# Patient Record
Sex: FEMALE | Race: BLACK OR AFRICAN AMERICAN | NOT HISPANIC OR LATINO | Employment: FULL TIME | URBAN - METROPOLITAN AREA
[De-identification: names, ages, dates, MRNs, and addresses within clinical notes are randomized per-mention and may not be internally consistent; named-entity substitution may affect disease eponyms.]

---

## 2019-04-12 ENCOUNTER — HOSPITAL ENCOUNTER (EMERGENCY)
Facility: HOSPITAL | Age: 34
Discharge: HOME | End: 2019-04-12
Attending: EMERGENCY MEDICINE

## 2019-04-12 VITALS
OXYGEN SATURATION: 100 % | RESPIRATION RATE: 20 BRPM | HEIGHT: 65 IN | TEMPERATURE: 99 F | HEART RATE: 67 BPM | WEIGHT: 240.94 LBS | SYSTOLIC BLOOD PRESSURE: 150 MMHG | BODY MASS INDEX: 40.14 KG/M2 | DIASTOLIC BLOOD PRESSURE: 88 MMHG

## 2019-04-12 DIAGNOSIS — R19.7 DIARRHEA, UNSPECIFIED TYPE: ICD-10-CM

## 2019-04-12 DIAGNOSIS — R11.2 NAUSEA & VOMITING: Primary | ICD-10-CM

## 2019-04-12 LAB
ALBUMIN SERPL BCP-MCNC: 4.5 G/DL (ref 3.5–5.2)
ALP SERPL-CCNC: 66 U/L (ref 55–135)
ALT SERPL W/O P-5'-P-CCNC: 18 U/L (ref 10–44)
ANION GAP SERPL CALC-SCNC: 14 MMOL/L (ref 8–16)
AST SERPL-CCNC: 16 U/L (ref 10–40)
B-HCG UR QL: NEGATIVE
BACTERIA #/AREA URNS HPF: ABNORMAL /HPF
BASOPHILS # BLD AUTO: 0.01 K/UL (ref 0–0.2)
BASOPHILS NFR BLD: 0.2 % (ref 0–1.9)
BILIRUB SERPL-MCNC: 0.6 MG/DL (ref 0.1–1)
BILIRUB UR QL STRIP: ABNORMAL
BUN SERPL-MCNC: 13 MG/DL (ref 6–20)
CALCIUM SERPL-MCNC: 9.9 MG/DL (ref 8.7–10.5)
CHLORIDE SERPL-SCNC: 101 MMOL/L (ref 95–110)
CLARITY UR: ABNORMAL
CO2 SERPL-SCNC: 25 MMOL/L (ref 23–29)
COLOR UR: YELLOW
CREAT SERPL-MCNC: 0.7 MG/DL (ref 0.5–1.4)
DIFFERENTIAL METHOD: ABNORMAL
EOSINOPHIL # BLD AUTO: 0 K/UL (ref 0–0.5)
EOSINOPHIL NFR BLD: 0.2 % (ref 0–8)
ERYTHROCYTE [DISTWIDTH] IN BLOOD BY AUTOMATED COUNT: 16.4 % (ref 11.5–14.5)
EST. GFR  (AFRICAN AMERICAN): >60 ML/MIN/1.73 M^2
EST. GFR  (NON AFRICAN AMERICAN): >60 ML/MIN/1.73 M^2
GLUCOSE SERPL-MCNC: 110 MG/DL (ref 70–110)
GLUCOSE UR QL STRIP: NEGATIVE
HCT VFR BLD AUTO: 40.7 % (ref 37–48.5)
HGB BLD-MCNC: 13.1 G/DL (ref 12–16)
HGB UR QL STRIP: ABNORMAL
HYALINE CASTS #/AREA URNS LPF: 0 /LPF
KETONES UR QL STRIP: ABNORMAL
LEUKOCYTE ESTERASE UR QL STRIP: NEGATIVE
LIPASE SERPL-CCNC: 25 U/L (ref 4–60)
LYMPHOCYTES # BLD AUTO: 1.6 K/UL (ref 1–4.8)
LYMPHOCYTES NFR BLD: 26 % (ref 18–48)
MCH RBC QN AUTO: 24.4 PG (ref 27–31)
MCHC RBC AUTO-ENTMCNC: 32.2 G/DL (ref 32–36)
MCV RBC AUTO: 76 FL (ref 82–98)
MICROSCOPIC COMMENT: ABNORMAL
MONOCYTES # BLD AUTO: 0.5 K/UL (ref 0.3–1)
MONOCYTES NFR BLD: 8.4 % (ref 4–15)
NEUTROPHILS # BLD AUTO: 3.9 K/UL (ref 1.8–7.7)
NEUTROPHILS NFR BLD: 65.2 % (ref 38–73)
NITRITE UR QL STRIP: NEGATIVE
PH UR STRIP: >8 [PH] (ref 5–8)
PLATELET # BLD AUTO: 287 K/UL (ref 150–350)
PMV BLD AUTO: 10.6 FL (ref 9.2–12.9)
POTASSIUM SERPL-SCNC: 3.2 MMOL/L (ref 3.5–5.1)
PROT SERPL-MCNC: 7.9 G/DL (ref 6–8.4)
PROT UR QL STRIP: ABNORMAL
RBC # BLD AUTO: 5.36 M/UL (ref 4–5.4)
RBC #/AREA URNS HPF: >100 /HPF (ref 0–4)
SODIUM SERPL-SCNC: 140 MMOL/L (ref 136–145)
SP GR UR STRIP: 1.01 (ref 1–1.03)
URN SPEC COLLECT METH UR: ABNORMAL
UROBILINOGEN UR STRIP-ACNC: 1 EU/DL
WBC # BLD AUTO: 6.04 K/UL (ref 3.9–12.7)
WBC #/AREA URNS HPF: 3 /HPF (ref 0–5)

## 2019-04-12 PROCEDURE — 81025 URINE PREGNANCY TEST: CPT

## 2019-04-12 PROCEDURE — 81000 URINALYSIS NONAUTO W/SCOPE: CPT

## 2019-04-12 PROCEDURE — 96374 THER/PROPH/DIAG INJ IV PUSH: CPT

## 2019-04-12 PROCEDURE — 80053 COMPREHEN METABOLIC PANEL: CPT

## 2019-04-12 PROCEDURE — 96375 TX/PRO/DX INJ NEW DRUG ADDON: CPT

## 2019-04-12 PROCEDURE — 83690 ASSAY OF LIPASE: CPT

## 2019-04-12 PROCEDURE — 96361 HYDRATE IV INFUSION ADD-ON: CPT

## 2019-04-12 PROCEDURE — 63600175 PHARM REV CODE 636 W HCPCS: Performed by: EMERGENCY MEDICINE

## 2019-04-12 PROCEDURE — 85025 COMPLETE CBC W/AUTO DIFF WBC: CPT

## 2019-04-12 PROCEDURE — 25000003 PHARM REV CODE 250: Performed by: EMERGENCY MEDICINE

## 2019-04-12 PROCEDURE — 99285 EMERGENCY DEPT VISIT HI MDM: CPT | Mod: 25

## 2019-04-12 RX ORDER — ALBUTEROL SULFATE 0.63 MG/3ML
0.63 SOLUTION RESPIRATORY (INHALATION) EVERY 6 HOURS PRN
COMMUNITY
End: 2023-11-03

## 2019-04-12 RX ORDER — ONDANSETRON 4 MG/1
4 TABLET, ORALLY DISINTEGRATING ORAL EVERY 6 HOURS PRN
Qty: 15 TABLET | Refills: 0 | Status: SHIPPED | OUTPATIENT
Start: 2019-04-12 | End: 2023-11-03

## 2019-04-12 RX ORDER — ONDANSETRON 2 MG/ML
4 INJECTION INTRAMUSCULAR; INTRAVENOUS
Status: COMPLETED | OUTPATIENT
Start: 2019-04-12 | End: 2019-04-12

## 2019-04-12 RX ORDER — PROMETHAZINE HYDROCHLORIDE 25 MG/1
25 SUPPOSITORY RECTAL EVERY 6 HOURS PRN
Qty: 10 SUPPOSITORY | Refills: 0 | Status: SHIPPED | OUTPATIENT
Start: 2019-04-12 | End: 2023-11-03

## 2019-04-12 RX ADMIN — PROMETHAZINE HYDROCHLORIDE 12.5 MG: 25 INJECTION INTRAMUSCULAR; INTRAVENOUS at 09:04

## 2019-04-12 RX ADMIN — ONDANSETRON HYDROCHLORIDE 4 MG: 2 SOLUTION INTRAMUSCULAR; INTRAVENOUS at 08:04

## 2019-04-12 RX ADMIN — SODIUM CHLORIDE 1000 ML: 0.9 INJECTION, SOLUTION INTRAVENOUS at 08:04

## 2019-04-12 NOTE — ED PROVIDER NOTES
SCRIBE #1 NOTE: I, Nita Heart, am scribing for, and in the presence of, Jose Richardson MD. I have scribed the entire note.      History      Chief Complaint   Patient presents with    Emesis     c/o N/V that started yesterday        Review of patient's allergies indicates:  No Known Allergies     HPI   HPI    4/12/2019, 7:28 AM   History obtained from the patient      History of Present Illness: Ирина Wolf is a 34 y.o. female patient who presents to the Emergency Department for n/v which onset gradually 48 hours PTA. Symptoms are episodic and moderate in severity. No mitigating or exacerbating factors reported. Associated sxs include sharp, epigastric abd pain. Pt reports she had diarrhea yesterday but has had no episodes today. Pt reports she began having blood in her vomit today. Patient denies any fever, chills, constipation, hematochezia, melena, dysuria, hematuria, frequency, sick contacts, and all other sxs at this time. First day of LMP was yesterday. No further complaints or concerns at this time.       Arrival mode: Personal vehicle      PCP: Provider [facility]       Past Medical History:  Past Medical History:   Diagnosis Date    Asthma     Seizures        Past Surgical History:  Past Surgical History:   Procedure Laterality Date    none           Family History:  History reviewed. No pertinent family history.    Social History:  Social History     Tobacco Use    Smoking status: Never Smoker    Smokeless tobacco: Never Used   Substance and Sexual Activity    Alcohol use: Not Currently    Drug use: unknown    Sexual activity: unknown       ROS   Review of Systems   Constitutional: Negative for chills and fever.   HENT: Negative for sore throat.    Respiratory: Negative for shortness of breath.    Cardiovascular: Negative for chest pain.   Gastrointestinal: Positive for abdominal pain, diarrhea, nausea and vomiting. Negative for blood in stool and constipation.   Genitourinary:  "Negative for dysuria, frequency and hematuria.   Musculoskeletal: Negative for back pain.   Skin: Negative for rash.   Neurological: Negative for weakness.   Hematological: Does not bruise/bleed easily.   All other systems reviewed and are negative.    Physical Exam      Initial Vitals [04/12/19 0729]   BP Pulse Resp Temp SpO2   (!) 148/69 85 18 98.6 °F (37 °C) 97 %      MAP       --          Physical Exam  Nursing Notes and Vital Signs Reviewed.  Constitutional: Patient is in no acute distress. Well-developed and well-nourished.  Head: Atraumatic. Normocephalic.  Eyes: PERRL. EOM intact. Conjunctivae are not pale. No scleral icterus.  ENT: Mucous membranes are moist. Oropharynx is clear and symmetric.    Neck: Supple. Full ROM. No lymphadenopathy.  Cardiovascular: Regular rate. Regular rhythm. No murmurs, rubs, or gallops. Distal pulses are 2+ and symmetric.  Pulmonary/Chest: No respiratory distress. Clear to auscultation bilaterally. No wheezing or rales.  Abdominal: Soft and non-distended.  There is no tenderness.  No rebound, guarding, or rigidity. Good bowel sounds.  Genitourinary: No CVA tenderness  Musculoskeletal: Moves all extremities. No obvious deformities.   Skin: Warm and dry.  Neurological:  Alert, awake, and appropriate.  Normal speech.  No acute focal neurological deficits are appreciated.  Psychiatric: Normal affect. Good eye contact. Appropriate in content.    ED Course    Procedures  ED Vital Signs:  Vitals:    04/12/19 0729 04/12/19 0817 04/12/19 0848 04/12/19 0918   BP: (!) 148/69 (!) 144/75 (!) 165/89 (!) 161/104   Pulse: 85 73 77 86   Resp: 18 20 20 20   Temp: 98.6 °F (37 °C)      TempSrc: Oral      SpO2: 97% 99% 100% 100%   Weight: 109.3 kg (240 lb 15.4 oz)      Height: 5' 5" (1.651 m)       04/12/19 0936 04/12/19 1002 04/12/19 1032   BP: 123/69 (!) 142/65 (!) 150/88   Pulse: 82 85 67   Resp: 20 (!) 55 20   Temp:  98.2 °F (36.8 °C) 99.2 °F (37.3 °C)   TempSrc:      SpO2: 100% 97% 100% "   Weight:      Height:          Abnormal Lab Results:  Labs Reviewed   CBC W/ AUTO DIFFERENTIAL - Abnormal; Notable for the following components:       Result Value    MCV 76 (*)     MCH 24.4 (*)     RDW 16.4 (*)     All other components within normal limits   COMPREHENSIVE METABOLIC PANEL - Abnormal; Notable for the following components:    Potassium 3.2 (*)     All other components within normal limits   URINALYSIS, REFLEX TO URINE CULTURE - Abnormal; Notable for the following components:    Appearance, UA Cloudy (*)     pH, UA >8.0 (*)     Protein, UA 2+ (*)     Ketones, UA 1+ (*)     Bilirubin (UA) 1+ (*)     Occult Blood UA 3+ (*)     All other components within normal limits    Narrative:     Preferred Collection Type->Urine, Clean Catch   URINALYSIS MICROSCOPIC - Abnormal; Notable for the following components:    RBC, UA >100 (*)     All other components within normal limits    Narrative:     Preferred Collection Type->Urine, Clean Catch   PREGNANCY TEST, URINE RAPID   LIPASE        All Lab Results:  Results for orders placed or performed during the hospital encounter of 04/12/19   CBC auto differential   Result Value Ref Range    WBC 6.04 3.90 - 12.70 K/uL    RBC 5.36 4.00 - 5.40 M/uL    Hemoglobin 13.1 12.0 - 16.0 g/dL    Hematocrit 40.7 37.0 - 48.5 %    MCV 76 (L) 82 - 98 fL    MCH 24.4 (L) 27.0 - 31.0 pg    MCHC 32.2 32.0 - 36.0 g/dL    RDW 16.4 (H) 11.5 - 14.5 %    Platelets 287 150 - 350 K/uL    MPV 10.6 9.2 - 12.9 fL    Gran # (ANC) 3.9 1.8 - 7.7 K/uL    Lymph # 1.6 1.0 - 4.8 K/uL    Mono # 0.5 0.3 - 1.0 K/uL    Eos # 0.0 0.0 - 0.5 K/uL    Baso # 0.01 0.00 - 0.20 K/uL    Gran% 65.2 38.0 - 73.0 %    Lymph% 26.0 18.0 - 48.0 %    Mono% 8.4 4.0 - 15.0 %    Eosinophil% 0.2 0.0 - 8.0 %    Basophil% 0.2 0.0 - 1.9 %    Differential Method Automated    Comprehensive metabolic panel   Result Value Ref Range    Sodium 140 136 - 145 mmol/L    Potassium 3.2 (L) 3.5 - 5.1 mmol/L    Chloride 101 95 - 110 mmol/L     CO2 25 23 - 29 mmol/L    Glucose 110 70 - 110 mg/dL    BUN, Bld 13 6 - 20 mg/dL    Creatinine 0.7 0.5 - 1.4 mg/dL    Calcium 9.9 8.7 - 10.5 mg/dL    Total Protein 7.9 6.0 - 8.4 g/dL    Albumin 4.5 3.5 - 5.2 g/dL    Total Bilirubin 0.6 0.1 - 1.0 mg/dL    Alkaline Phosphatase 66 55 - 135 U/L    AST 16 10 - 40 U/L    ALT 18 10 - 44 U/L    Anion Gap 14 8 - 16 mmol/L    eGFR if African American >60 >60 mL/min/1.73 m^2    eGFR if non African American >60 >60 mL/min/1.73 m^2   Urinalysis, Reflex to Urine Culture Urine, Clean Catch   Result Value Ref Range    Specimen UA Urine, Clean Catch     Color, UA Yellow Yellow, Straw, Ligia    Appearance, UA Cloudy (A) Clear    pH, UA >8.0 (A) 5.0 - 8.0    Specific Gravity, UA 1.010 1.005 - 1.030    Protein, UA 2+ (A) Negative    Glucose, UA Negative Negative    Ketones, UA 1+ (A) Negative    Bilirubin (UA) 1+ (A) Negative    Occult Blood UA 3+ (A) Negative    Nitrite, UA Negative Negative    Urobilinogen, UA 1.0 <2.0 EU/dL    Leukocytes, UA Negative Negative   Pregnancy, urine rapid   Result Value Ref Range    Preg Test, Ur Negative    Lipase   Result Value Ref Range    Lipase 25 4 - 60 U/L   Urinalysis Microscopic   Result Value Ref Range    RBC, UA >100 (H) 0 - 4 /hpf    WBC, UA 3 0 - 5 /hpf    Bacteria, UA None None-Occ /hpf    Hyaline Casts, UA 0 0-1/lpf /lpf    Microscopic Comment SEE COMMENT          Imaging Results:  Imaging Results          X-Ray Abdomen Flat And Erect (Final result)  Result time 04/12/19 10:02:26    Final result by KAREN Caballero Sr., MD (04/12/19 10:02:26)                 Impression:      Normal study.      Electronically signed by: John Caballero MD  Date:    04/12/2019  Time:    10:02             Narrative:    EXAMINATION:  XR ABDOMEN FLAT AND ERECT    CLINICAL HISTORY:  Nausea with vomiting, unspecified    COMPARISON:  None    FINDINGS:  The bowel gas pattern is normal in appearance. There is no pneumoperitoneum. The bony structures appear intact.                                         The Emergency Provider reviewed the vital signs and test results, which are outlined above.    ED Discussion     10:13 AM: Reassessed pt at this time.  Pt states her condition has improved at this time. Pt is tolerating PO. Discussed with pt all pertinent ED information and results. Discussed pt dx and plan of tx. Gave pt all f/u and return to the ED instructions. All questions and concerns were addressed at this time. Pt expresses understanding of information and instructions, and is comfortable with plan to discharge. Pt is stable for discharge.    I discussed with patient and/or family/caretaker that evaluation in the ED does not suggest any emergent or life threatening medical conditions requiring immediate intervention beyond what was provided in the ED, and I believe patient is safe for discharge.  Regardless, an unremarkable evaluation in the ED does not preclude the development or presence of a serious of life threatening condition. As such, patient was instructed to return immediately for any worsening or change in current symptoms.      ED Medication(s):  Medications   ondansetron injection 4 mg (4 mg Intravenous Given 4/12/19 0802)   sodium chloride 0.9% bolus 1,000 mL (0 mLs Intravenous Stopped 4/12/19 1002)   promethazine (PHENERGAN) 12.5 mg in dextrose 5 % 50 mL IVPB (0 mg Intravenous Stopped 4/12/19 0937)     Discharge Medication List as of 4/12/2019 10:14 AM      START taking these medications    Details   ondansetron (ZOFRAN-ODT) 4 MG TbDL Take 1 tablet (4 mg total) by mouth every 6 (six) hours as needed., Starting Fri 4/12/2019, Print      promethazine (PHENERGAN) 25 MG suppository Place 1 suppository (25 mg total) rectally every 6 (six) hours as needed for Nausea., Starting Fri 4/12/2019, Print           Follow-up Information     Schedule an appointment as soon as possible for a visit  with [facility].    Specialty:  Internal  Medicine  Contact information:  [address] [facility] [address] [phone]  Additional information:  2nd Floor - From [address], take the [facility] exit [address] Enter the facility from the [address]                   Medical Decision Making    Medical Decision Making:   Clinical Tests:   Lab Tests: Ordered and Reviewed  Radiological Study: Ordered and Reviewed           Scribe Attestation:   Scribe #1: I performed the above scribed service and the documentation accurately describes the services I performed. I attest to the accuracy of the note.    Attending:   Physician Attestation Statement for Scribe #1: I, Jose Richardson MD, personally performed the services described in this documentation, as scribed by Nita Heart, in my presence, and it is both accurate and complete.          Clinical Impression       ICD-10-CM ICD-9-CM   1. Nausea & vomiting R11.2 787.01   2. Diarrhea, unspecified type R19.7 787.91       Disposition:   Disposition: Discharged  Condition: Stable         Jose Richardson MD  04/12/19 1121

## 2019-04-12 NOTE — ED NOTES
Pt lying in bed in NAD,VSS,RR equal and unlabored. Bed is low, locked, and call light in reach. Side rails up x 2.  Pt c/o mild pain to the epigastric region and stated that the phenergan has helped with nausea.

## 2023-11-03 PROBLEM — G40.909 SEIZURE DISORDER: Status: ACTIVE | Noted: 2017-01-06

## 2023-11-03 PROBLEM — F51.04 CHRONIC INSOMNIA: Status: ACTIVE | Noted: 2023-11-03

## 2023-11-03 PROBLEM — Z79.899 ENCOUNTER FOR LONG-TERM (CURRENT) USE OF OTHER MEDICATIONS: Status: ACTIVE | Noted: 2023-11-03

## 2023-11-03 PROBLEM — I10 MALIGNANT ESSENTIAL HYPERTENSION: Status: ACTIVE | Noted: 2021-02-18

## 2023-11-03 PROBLEM — F41.1 GAD (GENERALIZED ANXIETY DISORDER): Status: ACTIVE | Noted: 2021-02-18

## 2024-02-08 PROBLEM — R63.5 ABNORMAL WEIGHT GAIN: Status: ACTIVE | Noted: 2024-02-08

## 2024-02-08 PROBLEM — R73.09 ELEVATED RANDOM BLOOD GLUCOSE LEVEL: Status: ACTIVE | Noted: 2024-02-08

## 2024-02-08 PROBLEM — F51.01 PRIMARY INSOMNIA: Status: ACTIVE | Noted: 2024-02-08

## 2024-02-08 PROBLEM — R79.89 LIVER FUNCTION TEST ABNORMALITY: Status: ACTIVE | Noted: 2024-02-08

## 2024-08-07 PROBLEM — E66.01 CLASS 3 OBESITY: Status: ACTIVE | Noted: 2024-08-07

## 2024-08-07 PROBLEM — F19.10 SUBSTANCE ABUSE: Status: ACTIVE | Noted: 2024-08-07

## 2024-08-08 PROBLEM — F10.21 ALCOHOL DEPENDENCE IN REMISSION: Status: ACTIVE | Noted: 2024-08-08

## 2024-09-18 ENCOUNTER — HOSPITAL ENCOUNTER (OUTPATIENT)
Dept: PULMONOLOGY | Facility: HOSPITAL | Age: 39
Discharge: HOME | End: 2024-09-18
Attending: PSYCHIATRY & NEUROLOGY
Payer: COMMERCIAL

## 2024-09-18 DIAGNOSIS — G40.909 SEIZURE DISORDER: ICD-10-CM

## 2024-09-18 PROCEDURE — 95819 EEG AWAKE AND ASLEEP: CPT

## 2024-09-18 PROCEDURE — 95819 EEG AWAKE AND ASLEEP: CPT | Mod: 26,,, | Performed by: PSYCHIATRY & NEUROLOGY

## 2024-09-18 NOTE — PROCEDURES
DATE EEG PERFORMED:  2024.      DATE EEG INTERPRETED: 2024.                     DURATION OF EE MINUTES.         LEVEL OF CONSCIOUSENESS    Awake and Sleep.         EEG BACKGROUND    The posterior dominant basic rhythm reaches 11-12 Hz, symmetric, reactive, well-modulated and well-sustained.         EEG CLASSIFICATION    Normal        IMPRESSION      The EEG is normal in the awake and sleep states.       There are no epileptiform discharges or lateralizing signs. No typical events were recorded. There is no electrographic evidence of seizure.There is no electrographic evidence of status epilepticus.         PLEASE NOTE THAT A NON-EPILEPTIFORM EEG DOES NOT RULE OUT EPILEPSY.        GERALDINE SOLIMAN MD, FAAN    Diplomate, American Board of Psychiatry and Neurology    Diplomate, American Board of Clinical Neurophysiology